# Patient Record
Sex: MALE | Race: WHITE | NOT HISPANIC OR LATINO | Employment: FULL TIME | URBAN - METROPOLITAN AREA
[De-identification: names, ages, dates, MRNs, and addresses within clinical notes are randomized per-mention and may not be internally consistent; named-entity substitution may affect disease eponyms.]

---

## 2021-04-20 ENCOUNTER — TRANSFERRED RECORDS (OUTPATIENT)
Dept: HEALTH INFORMATION MANAGEMENT | Facility: CLINIC | Age: 21
End: 2021-04-20

## 2021-05-17 ENCOUNTER — OFFICE VISIT (OUTPATIENT)
Dept: NEUROLOGY | Facility: CLINIC | Age: 21
End: 2021-05-17
Payer: COMMERCIAL

## 2021-05-17 ENCOUNTER — RECORDS - HEALTHEAST (OUTPATIENT)
Dept: ADMINISTRATIVE | Facility: OTHER | Age: 21
End: 2021-05-17

## 2021-05-17 ENCOUNTER — RECORDS - HEALTHEAST (OUTPATIENT)
Dept: SCHEDULING | Facility: CLINIC | Age: 21
End: 2021-05-17

## 2021-05-17 VITALS
WEIGHT: 152 LBS | HEIGHT: 74 IN | HEART RATE: 96 BPM | DIASTOLIC BLOOD PRESSURE: 96 MMHG | SYSTOLIC BLOOD PRESSURE: 133 MMHG | BODY MASS INDEX: 19.51 KG/M2

## 2021-05-17 DIAGNOSIS — R25.1 SHAKINESS: ICD-10-CM

## 2021-05-17 DIAGNOSIS — R68.89 HYPERLEXIA: ICD-10-CM

## 2021-05-17 DIAGNOSIS — R25.1 TREMOR: ICD-10-CM

## 2021-05-17 DIAGNOSIS — R29.2 HYPERREFLEXIA: Primary | ICD-10-CM

## 2021-05-17 PROCEDURE — 99204 OFFICE O/P NEW MOD 45 MIN: CPT | Performed by: PSYCHIATRY & NEUROLOGY

## 2021-05-17 RX ORDER — ALBUTEROL SULFATE 90 UG/1
2 AEROSOL, METERED RESPIRATORY (INHALATION) PRN
COMMUNITY

## 2021-05-17 RX ORDER — SERTRALINE HYDROCHLORIDE 100 MG/1
100 TABLET, FILM COATED ORAL DAILY
COMMUNITY

## 2021-05-17 SDOH — HEALTH STABILITY: MENTAL HEALTH: HOW MANY STANDARD DRINKS CONTAINING ALCOHOL DO YOU HAVE ON A TYPICAL DAY?: NOT ASKED

## 2021-05-17 SDOH — HEALTH STABILITY: MENTAL HEALTH: HOW OFTEN DO YOU HAVE 6 OR MORE DRINKS ON ONE OCCASION?: NOT ASKED

## 2021-05-17 SDOH — HEALTH STABILITY: MENTAL HEALTH: HOW OFTEN DO YOU HAVE A DRINK CONTAINING ALCOHOL?: NOT ASKED

## 2021-05-17 ASSESSMENT — MIFFLIN-ST. JEOR: SCORE: 1764.22

## 2021-05-17 NOTE — NURSING NOTE
Chief Complaint   Patient presents with     Extremity Weakness     Bilat leg weakness and shakines. Pt states he has had these symptoms for about a year.     Lesly Chinchilla LPN on 5/17/2021 at 9:09 AM

## 2021-05-17 NOTE — PROGRESS NOTES
INITIAL NEUROLOGY CONSULTATION    DATE OF VISIT: 5/17/2021  MRN: [id number]  PATIENT NAME: Zach Acosta Jr.  YOB: 2000    REFERRING PROVIDER: Shreyas Qureshi    Chief Complaint   Patient presents with     Extremity Weakness     Bilat leg weakness and shakines. Pt states he has had these symptoms for about a year.       SUBJECTIVE:                                                      HPI:   Zach Acosta Jr. is a 20 year old male here for consultation regarding leg weakness. No records on file with regards to this issue.     Fede is here with his mother today.  Says that he started to notice shakiness in the legs about a year ago.  He notices this particularly when walking upstairs or sometimes if he straightens the leg out start to shake.  He has not noticed any weakness per se.  No problems with his lower back that he is aware of.  No sensory changes in the legs.  He has not noticed any shakiness in the arms.  He does have history of anxiety, and does not think that increased anxiety contributes to the shakiness.  He is also on albuterol, which does not seem to make things worse.  He says that he did try doing some squats one day and thereafter he felt that the shakiness had improved.  However, he has tried this again since and exercise does not seem to make his shakiness better or worse.  He has not noticed any visual changes such as double vision.  No problems controlling the bladder or bowels.  No history of back injury or surgery.    There is family history of stroke in the patient's grandfather there Parkinson's and paternal grandfather.  The patient himself has a protein C deficiency, as does his father.  He also endorses depression.  He is on Zoloft for mood, and says he started this a couple of months ago.  The shakiness predates the start of the medication.    Patient's mom mentions that he has had tend to be very active.  Because of his anxiety he spends a lot of time in his  "room.  She wonders if his lack of exercise is contributing to his symptoms.    No past medical history on file.  No past surgical history on file.    albuterol (PROAIR HFA/PROVENTIL HFA/VENTOLIN HFA) 108 (90 Base) MCG/ACT inhaler, Inhale 2 puffs into the lungs as needed for shortness of breath / dyspnea or wheezing  sertraline (ZOLOFT) 100 MG tablet, Take 100 mg by mouth daily    No current facility-administered medications on file prior to visit.     No Known Allergies     Problem (# of Occurrences) Relation (Name,Age of Onset)    Parkinsonism (1) Paternal Grandfather    Protein C deficiency (1) Father        Social History     Tobacco Use     Smoking status: Current Every Day Smoker     Smokeless tobacco: Never Used     Tobacco comment: e-cig   Substance Use Topics     Alcohol use: Yes     Comment: occas     Drug use: None       REVIEW OF SYSTEMS:                                                      10-point review of systems is negative except as mentioned above in HPI.     EXAM:                                                      Physical Exam:   Vitals: BP (!) 133/96 (BP Location: Right arm, Patient Position: Sitting)   Pulse 96   Ht 1.88 m (6' 2\")   Wt 68.9 kg (152 lb)   BMI 19.52 kg/m    BMI= Body mass index is 19.52 kg/m .  GENERAL: NAD. Normal affect.  HEENT: NC/AT.   CV: RRR. S1, S2.   NECK: No bruits.  PULM: Non-labored breathing.   EXTR: Fingertips are pink (he says they are always this way). Warm, well-perfused.  Neurologic:  MENTAL STATUS: Alert, attentive. Speech is fluent. Normal comprehension. Normal concentration. Adequate fund of knowledge.   CRANIAL NERVES: Discs flat. Visual fields intact to confrontation. Pupils equally, round and reactive to light. Facial sensation and movement normal. EOM full. Hearing intact to conversation. Trapezius strength intact. Palate moves symmetrically. Tongue midline.  MOTOR: Slight weakness with hip flexion on the right. Otherwise strength is 5/5 in proximal " and distal muscle groups of upper and lowerextremities.   Tone and bulk normal.   DTRs: Intact and symmetric in biceps, BR, triceps (2+). 3+ at patellae bilaterally.  Babinski down-going bilaterally. Question of clonus, but I cannot tell if this is just his underlying shakiness.  SENSATION: Normal light touch and pinprick. Intact proprioception at great toes. Vibration: Slightly decreased at both ankles.   COORDINATION: Normal finger nose finger. Finger tapping normal. Knee heel shin normal.  STATION AND GAIT: Romberg Negative. Good postural reflexes. Casual gait and tandem normal.  Intermittent leg tremor (L>R), noted when patient was seated.  Right hand-dominant.    ASSESSMENT and PLAN:                                                      Assessment:     ICD-10-CM    1. Hyperreflexia  R29.2 Comprehensive metabolic panel     CK total     TSH with free T4 reflex     Vitamin B12     Vitamin B6     Vitamin D Deficiency     MR Lumbar Spine w/o Contrast   2. Tremor  R25.1    3. Shakiness  R25.1 Comprehensive metabolic panel     CK total     TSH with free T4 reflex     Vitamin B12     Vitamin B6     Vitamin D Deficiency     MR Lumbar Spine w/o Contrast        Mr. Acosta is a pleasant 20-year-old man with history of anxiety and depression, asthma, here for one year history of shakiness in the legs.  The shakiness is intermittent.  He is quite brisk in the lower extremities and I detected perhaps slight weakness in the proximal muscles of the right lower extremity.  I had difficulty in assessing Zach for clonus, with the underlying shakiness confounding the exam.  I am wondering about a possible underlying myopathy.  I do not suspect a neuromuscular junction disorder.  We will go ahead and do some basic labs and then get a lumbar MRI to see if this provides any clues regarding the etiology of his shakiness.  We may want to go ahead and do an EMG if the lumbar imaging is not diagnostic.  I explained to the patient and  his mother that this could also just be an underlying tremor.  If we do not find an alternative cause for the shakiness, we could consider doing a trial of a medication to calm the tremor.  I would like to see Zach back in a couple of months.  He understands and agrees with the plan.    Zach to follow up with Primary Care provider regarding elevated blood pressure.    Plan:  -- Labs for the shakiness.   -- Lumbar MRI.  -- We we will notify you of the test results and let you know if any additional evaluations are recommended.  -- Return to Neurology in 2 months.     Total Time: 45 minutes were spent with the patient and in chart review/documentation (as described above in Assessment and Plan) /coordinating the care.    Letty Alfaro MD  Neurology    CC: Shreyas Qureshi MD    Dragon software used in dictation of this note.

## 2021-05-17 NOTE — LETTER
5/17/2021         RE: Zach Acosta Jr.  [address]  [place] [address]        Dear Colleague,    Thank you for referring your patient, Zach Acosta Jr., to the [facility] [place]. Please see a copy of my visit note below.    INITIAL NEUROLOGY CONSULTATION    DATE OF VISIT: 5/17/2021  MRN: [id number]  PATIENT NAME: Zach Acosta Jr.  YOB: 2000    REFERRING PROVIDER: Shreyas Qureshi    Chief Complaint   Patient presents with     Extremity Weakness     Bilat leg weakness and shakines. Pt states he has had these symptoms for about a year.       SUBJECTIVE:                                                      HPI:   Zach Acosta Jr. is a 20 year old male here for consultation regarding leg weakness. No records on file with regards to this issue.     Fede is here with his mother today.  Says that he started to notice shakiness in the legs about a year ago.  He notices this particularly when walking upstairs or sometimes if he straightens the leg out start to shake.  He has not noticed any weakness per se.  No problems with his lower back that he is aware of.  No sensory changes in the legs.  He has not noticed any shakiness in the arms.  He does have history of anxiety, and does not think that increased anxiety contributes to the shakiness.  He is also on albuterol, which does not seem to make things worse.  He says that he did try doing some squats one day and thereafter he felt that the shakiness had improved.  However, he has tried this again since and exercise does not seem to make his shakiness better or worse.  He has not noticed any visual changes such as double vision.  No problems controlling the bladder or bowels.  No history of back injury or surgery.    There is family history of stroke in the patient's grandfather there Parkinson's and paternal grandfather.  The patient himself has a protein C deficiency, as does his father.   "He also endorses depression.  He is on Zoloft for mood, and says he started this a couple of months ago.  The shakiness predates the start of the medication.    Patient's mom mentions that he has had tend to be very active.  Because of his anxiety he spends a lot of time in his room.  She wonders if his lack of exercise is contributing to his symptoms.    No past medical history on file.  No past surgical history on file.    albuterol (PROAIR HFA/PROVENTIL HFA/VENTOLIN HFA) 108 (90 Base) MCG/ACT inhaler, Inhale 2 puffs into the lungs as needed for shortness of breath / dyspnea or wheezing  sertraline (ZOLOFT) 100 MG tablet, Take 100 mg by mouth daily    No current facility-administered medications on file prior to visit.     No Known Allergies     Problem (# of Occurrences) Relation (Name,Age of Onset)    Parkinsonism (1) Paternal Grandfather    Protein C deficiency (1) Father        Social History     Tobacco Use     Smoking status: Current Every Day Smoker     Smokeless tobacco: Never Used     Tobacco comment: e-cig   Substance Use Topics     Alcohol use: Yes     Comment: occas     Drug use: None       REVIEW OF SYSTEMS:                                                      10-point review of systems is negative except as mentioned above in HPI.     EXAM:                                                      Physical Exam:   Vitals: BP (!) 133/96 (BP Location: Right arm, Patient Position: Sitting)   Pulse 96   Ht 1.88 m (6' 2\")   Wt 68.9 kg (152 lb)   BMI 19.52 kg/m    BMI= Body mass index is 19.52 kg/m .  GENERAL: NAD. Normal affect.  HEENT: NC/AT.   CV: RRR. S1, S2.   NECK: No bruits.  PULM: Non-labored breathing.   EXTR: Fingertips are pink (he says they are always this way). Warm, well-perfused.  Neurologic:  MENTAL STATUS: Alert, attentive. Speech is fluent. Normal comprehension. Normal concentration. Adequate fund of knowledge.   CRANIAL NERVES: Discs flat. Visual fields intact to confrontation. Pupils " equally, round and reactive to light. Facial sensation and movement normal. EOM full. Hearing intact to conversation. Trapezius strength intact. Palate moves symmetrically. Tongue midline.  MOTOR: Slight weakness with hip flexion on the right. Otherwise strength is 5/5 in proximal and distal muscle groups of upper and lowerextremities.   Tone and bulk normal.   DTRs: Intact and symmetric in biceps, BR, triceps (2+). 3+ at patellae bilaterally.  Babinski down-going bilaterally. Question of clonus, but I cannot tell if this is just his underlying shakiness.  SENSATION: Normal light touch and pinprick. Intact proprioception at great toes. Vibration: Slightly decreased at both ankles.   COORDINATION: Normal finger nose finger. Finger tapping normal. Knee heel shin normal.  STATION AND GAIT: Romberg Negative. Good postural reflexes. Casual gait and tandem normal.  Intermittent leg tremor (L>R), noted when patient was seated.  Right hand-dominant.    ASSESSMENT and PLAN:                                                      Assessment:     ICD-10-CM    1. Hyperreflexia  R29.2 Comprehensive metabolic panel     CK total     TSH with free T4 reflex     Vitamin B12     Vitamin B6     Vitamin D Deficiency     MR Lumbar Spine w/o Contrast   2. Tremor  R25.1    3. Shakiness  R25.1 Comprehensive metabolic panel     CK total     TSH with free T4 reflex     Vitamin B12     Vitamin B6     Vitamin D Deficiency     MR Lumbar Spine w/o Contrast        Mr. Acosta is a pleasant 20-year-old man with history of anxiety and depression, asthma, here for one year history of shakiness in the legs.  The shakiness is intermittent.  He is quite brisk in the lower extremities and I detected perhaps slight weakness in the proximal muscles of the right lower extremity.  I had difficulty in assessing Zach for clonus, with the underlying shakiness confounding the exam.  I am wondering about a possible underlying myopathy.  I do not suspect a  neuromuscular junction disorder.  We will go ahead and do some basic labs and then get a lumbar MRI to see if this provides any clues regarding the etiology of his shakiness.  We may want to go ahead and do an EMG if the lumbar imaging is not diagnostic.  I explained to the patient and his mother that this could also just be an underlying tremor.  If we do not find an alternative cause for the shakiness, we could consider doing a trial of a medication to calm the tremor.  I would like to see Zach back in a couple of months.  He understands and agrees with the plan.    Zach to follow up with Primary Care provider regarding elevated blood pressure.    Plan:  -- Labs for the shakiness.   -- Lumbar MRI.  -- We we will notify you of the test results and let you know if any additional evaluations are recommended.  -- Return to Neurology in 2 months.     Total Time: 45 minutes were spent with the patient and in chart review/documentation (as described above in Assessment and Plan) /coordinating the care.    Letty Alfaro MD  Neurology    CC: Shreyas Qureshi MD    Dragon software used in dictation of this note.        Again, thank you for allowing me to participate in the care of your patient.        Sincerely,        Letty Alfaro MD

## 2021-05-17 NOTE — PATIENT INSTRUCTIONS
Plan:  -- Labs for the shakiness.   -- Lumbar MRI.  -- We we will notify you of the test results and let you know if any additional evaluations are recommended.  -- Return to Neurology in 2 months.

## 2021-07-28 ENCOUNTER — APPOINTMENT (OUTPATIENT)
Dept: URBAN - METROPOLITAN AREA CLINIC 260 | Age: 21
Setting detail: DERMATOLOGY
End: 2021-07-29

## 2021-07-28 VITALS — WEIGHT: 155 LBS | RESPIRATION RATE: 15 BRPM | HEIGHT: 74 IN

## 2021-07-28 DIAGNOSIS — L72.0 EPIDERMAL CYST: ICD-10-CM

## 2021-07-28 DIAGNOSIS — D22 MELANOCYTIC NEVI: ICD-10-CM

## 2021-07-28 PROBLEM — D22.39 MELANOCYTIC NEVI OF OTHER PARTS OF FACE: Status: ACTIVE | Noted: 2021-07-28

## 2021-07-28 PROBLEM — D22.5 MELANOCYTIC NEVI OF TRUNK: Status: ACTIVE | Noted: 2021-07-28

## 2021-07-28 PROCEDURE — OTHER INCISION AND DRAINAGE: OTHER

## 2021-07-28 PROCEDURE — 99202 OFFICE O/P NEW SF 15 MIN: CPT | Mod: 25

## 2021-07-28 PROCEDURE — OTHER COUNSELING: OTHER

## 2021-07-28 PROCEDURE — 10060 I&D ABSCESS SIMPLE/SINGLE: CPT

## 2021-07-28 PROCEDURE — OTHER ADDITIONAL NOTES: OTHER

## 2021-07-28 ASSESSMENT — LOCATION DETAILED DESCRIPTION DERM
LOCATION DETAILED: LEFT CENTRAL ZYGOMA
LOCATION DETAILED: RIGHT SUPERIOR CENTRAL MALAR CHEEK
LOCATION DETAILED: RIGHT LATERAL ABDOMEN

## 2021-07-28 ASSESSMENT — LOCATION SIMPLE DESCRIPTION DERM
LOCATION SIMPLE: RIGHT CHEEK
LOCATION SIMPLE: LEFT ZYGOMA
LOCATION SIMPLE: ABDOMEN

## 2021-07-28 ASSESSMENT — LOCATION ZONE DERM
LOCATION ZONE: TRUNK
LOCATION ZONE: FACE

## 2021-07-28 NOTE — PROCEDURE: ADDITIONAL NOTES
Additional Notes: Patient deferred cosmetic treatment today. If lesions become bothersome in the future, removal would be appropriate. Patient verbalized understanding.
Detail Level: Simple
Render Risk Assessment In Note?: no

## 2021-07-28 NOTE — PROCEDURE: INCISION AND DRAINAGE
Method: 11 blade
Curette Text (Optional): After the contents were expressed a curette was used to partially remove the cyst wall.
Drainage Type?: cyst-like
Include Sutures?: No
Consent was obtained and risks were reviewed including but not limited to delayed wound healing, infection, need for multiple I and D's, and pain.
Detail Level: Detailed
Epidermal Closure: simple interrupted
Epidermal Sutures: 4-0 Ethilon
Suture Text: The incision was partially closed with
Size Of Lesion In Cm (Optional But May Be Required For Some Insurances): 0
Post-Care Instructions: I reviewed with the patient in detail post-care instructions. Patient should keep wound covered and call the office should any redness, pain, swelling or worsening occur.
Drainage Amount?: minimal
Preparation Text: The area was prepped in the usual clean fashion.
Lesion Type: Abscess
Dressing: no dressing

## 2022-06-13 ENCOUNTER — TELEPHONE (OUTPATIENT)
Dept: HEMATOLOGY | Facility: CLINIC | Age: 22
End: 2022-06-13
Payer: COMMERCIAL

## 2022-06-13 DIAGNOSIS — Z83.2 FAMILY HISTORY OF CLOTTING DISORDER: Primary | ICD-10-CM

## 2022-06-13 NOTE — TELEPHONE ENCOUNTER
Zach Acosta Jr is a 21yr old male who is son of Zach Acosta Sr.  Zach Khan is followed by the [facility] for his dx of Factor 5 Leiden mutation and protein C deficiency.    Call received from Zach Kowalski and his mother Shanice, to inquire about getting tested for Factor 5 Leiden and protein C deficiency.      Order placed and lab draw scheduled for 6/17 at 4pm at the [facility].    Wendy OHN, RN, PHN   [facility]   Office: [phone]  Fax: [phone]

## 2022-06-24 ENCOUNTER — LAB (OUTPATIENT)
Dept: LAB | Facility: CLINIC | Age: 22
End: 2022-06-24
Payer: COMMERCIAL

## 2022-06-24 DIAGNOSIS — Z83.2 FAMILY HISTORY OF CLOTTING DISORDER: ICD-10-CM

## 2022-06-24 LAB
FACTOR V INTERPRETATION: NORMAL
LAB DIRECTOR COMMENTS: NORMAL
LAB DIRECTOR DISCLAIMER: NORMAL
LAB DIRECTOR INTERPRETATION: NORMAL
LAB DIRECTOR METHODOLOGY: NORMAL
LAB DIRECTOR RESULTS: NORMAL
SPECIMEN DESCRIPTION: NORMAL

## 2022-06-24 PROCEDURE — 85303 CLOT INHIBIT PROT C ACTIVITY: CPT

## 2022-06-24 PROCEDURE — 81241 F5 GENE: CPT

## 2022-06-24 PROCEDURE — 36415 COLL VENOUS BLD VENIPUNCTURE: CPT

## 2022-06-24 PROCEDURE — G0452 MOLECULAR PATHOLOGY INTERPR: HCPCS | Mod: 26 | Performed by: PATHOLOGY

## 2022-06-27 LAB — PROT C ACT/NOR PPP CHRO: 52 % (ref 70–170)

## 2022-07-28 ENCOUNTER — OFFICE VISIT (OUTPATIENT)
Dept: HEMATOLOGY | Facility: CLINIC | Age: 22
End: 2022-07-28
Attending: INTERNAL MEDICINE
Payer: COMMERCIAL

## 2022-07-28 VITALS
TEMPERATURE: 97.5 F | RESPIRATION RATE: 16 BRPM | BODY MASS INDEX: 20.58 KG/M2 | OXYGEN SATURATION: 99 % | HEIGHT: 73 IN | HEART RATE: 65 BPM | WEIGHT: 155.3 LBS | DIASTOLIC BLOOD PRESSURE: 81 MMHG | SYSTOLIC BLOOD PRESSURE: 122 MMHG

## 2022-07-28 DIAGNOSIS — Z82.49 FAMILY HISTORY OF DEEP VEIN THROMBOSIS: ICD-10-CM

## 2022-07-28 DIAGNOSIS — Z82.49 FAMILY HISTORY OF PULMONARY EMBOLISM: ICD-10-CM

## 2022-07-28 DIAGNOSIS — D68.59 PROTEIN C DEFICIENCY (H): Primary | ICD-10-CM

## 2022-07-28 PROCEDURE — 99205 OFFICE O/P NEW HI 60 MIN: CPT | Performed by: INTERNAL MEDICINE

## 2022-07-28 PROCEDURE — G0463 HOSPITAL OUTPT CLINIC VISIT: HCPCS

## 2022-07-28 ASSESSMENT — PAIN SCALES - GENERAL: PAINLEVEL: NO PAIN (0)

## 2022-07-28 NOTE — PROGRESS NOTES
"    [facility]  [facility]  [facility] [address], [address], [place] [address]  Main: [phone], Fax: [phone]         Outpatient Clinic Visit  Date:  07/28/2022    Zach \"Cheo\"Jr. Karla is a 21-year-old male with a family history of genetic thrombophilia who is here today for consultation regarding his risk of thrombosis.  He is accompanied by his 2 siblings and his parents.    Cheo is a member of a very large extended family from which we have seen several individuals over the last 3 years.  Both factor V Leiden and protein C deficiency are present in this family.  We have identified 9 individuals with documented venous thromboembolism.  8 of those 9 individuals have had their thrombotic event before age 40, and 4 occurred before age 30.  There are at least 4 unprovoked episodes of venous thromboembolism in the family.  Thus far, all thrombotic events in the family have occurred in individuals known to have both factor V Leiden and protein C deficiency although our data are incomplete in that regard.  We have recommended initiation of primary anticoagulant prophylaxis for some members of this family.    Cheo has no personal history of venous thrombosis.  His medical history is remarkable for asthma for which he uses an inhaler as needed.    Physical exam:   He appears healthy.  Detailed exam not performed.    Labs:  Factor V Leiden negative  Protein C activity 52% (normal )      ASSESSMENT / PLAN:  1.  Strong family history of venous thromboembolism and genetic thrombophilia  2.  No personal history of thrombosis  3.  Protein C deficiency    We had a detailed discussion today with Cheo and his family regarding venous thromboembolism and genetic thrombophilia.  We discussed factor V Leiden and protein C deficiency in detail including the mode of inheritance, and the differences in risk associated with each of these genetic thrombophilias.  We " discussed the concept of genetic predisposition plus acquired triggers leading to episodes of venous thromboembolism, and how the interaction of 2 or more genetic and/or acquired risk factors magnifies risk.  We also discussed the effect of age on the risk of venous thromboembolism.    Cheo does not have factor V Leiden but he is protein C deficient.  We discussed that in the context of his extended family's history, gauging his thrombotic risk is difficult as thus far all individuals with documented episodes of venous thromboembolism are those who have both factor V Leiden and protein C deficiency.  At the present time, we have not seen any individuals from this family who only have protein C deficiency.  Thus, although his risk of venous thromboembolism is certainly increased, it is likely lower than other family members who have had episodes of venous thromboembolism.  That said, it is difficult to know how much lower his risk may be.  We have recommended initiation of primary anticoagulant prophylaxis for some members of his extended family who are double heterozygous for factor V Leiden and protein C deficiency, but it is unclear whether that same recommendation is appropriate for Cheo.    Given that he is still young, this decision is one which can continue to be considered.  That said, other members of his extended family have had episodes of venous thromboembolism prior to age 30.  Thus, I would recommend at least annual reassessment and reconsideration of his options.  In addition, if additional information on other members of his family becomes available (particularly those who are only protein C deficient), that may also have an impact on our thinking.    In the meantime, we discussed the signs and symptoms of deep vein thrombosis and pulmonary embolism and the importance of prompt evaluation should there be any suspicion.  We also discussed the importance of aggressive prophylaxis during times of  increased risk including with injury and immobilization, and surgical procedures (even those typically considered low risk).    During the course of our discussion, Cheo and his family asked insightful questions and appeared to have a clear understanding of all the things we reviewed.  We will schedule him for a 1 year return visit to reassess the option of initiation of primary anticoagulant prophylaxis.  In the meantime, he was given our contact information and encouraged to call with any new questions or concerns.      Total time 60 minutes, including review of medical records and labs, clinic visit, and documentation.      Aubrey Correa MD  Professor of Medicine   Division of Hematology, Oncology, and Transplantation  Director, [facility]

## 2022-08-23 ENCOUNTER — TELEPHONE (OUTPATIENT)
Dept: HEMATOLOGY | Facility: CLINIC | Age: 22
End: 2022-08-23

## 2022-08-23 NOTE — TELEPHONE ENCOUNTER
Zach Acosta is a 21 year old male seen by Dr. Correa at the [facility] for his protein c deficiency. He is calling today because he is contemplating starting Xarelto. He would like to speak with Dr. Correa 1 more time. RN spoke with Dr. Correa who will reach out via telephone early next week. Zach was fine with this plan.     Jessi REYNAGA, RN, PHN  [facility]  Office: [phone]  Fax: [phone]    Addendum:    Call received from Shanice Acosta, Zach Kowalski's mother, who states that Zach Kowalski has decided he's ready to start his Xarelto. Dr. Correa sent Xarelto 20mg tablets to the [facility] in [place]. Staff shared instructions and went to call the [facility] with Shanice, who will share with Zach Kowalski.    Wendy OHN, RN, PHN   [facility]   Office: [phone]  Fax: [phone]

## 2022-09-08 DIAGNOSIS — D68.59 PROTEIN C DEFICIENCY (H): Primary | ICD-10-CM

## 2022-09-08 DIAGNOSIS — Z82.49 FAMILY HISTORY OF DEEP VEIN THROMBOSIS: ICD-10-CM

## 2022-09-08 DIAGNOSIS — Z82.49 FAMILY HISTORY OF PULMONARY EMBOLISM: ICD-10-CM

## 2023-07-05 ENCOUNTER — TELEPHONE (OUTPATIENT)
Dept: HEMATOLOGY | Facility: CLINIC | Age: 23
End: 2023-07-05
Payer: COMMERCIAL

## 2023-08-04 ENCOUNTER — TELEPHONE (OUTPATIENT)
Dept: HEMATOLOGY | Facility: CLINIC | Age: 23
End: 2023-08-04
Payer: COMMERCIAL

## 2024-09-26 ENCOUNTER — APPOINTMENT (OUTPATIENT)
Dept: URBAN - METROPOLITAN AREA CLINIC 260 | Age: 24
Setting detail: DERMATOLOGY
End: 2024-09-26

## 2024-09-26 VITALS — HEIGHT: 60 IN | WEIGHT: 160 LBS

## 2024-09-26 DIAGNOSIS — D485 NEOPLASM OF UNCERTAIN BEHAVIOR OF SKIN: ICD-10-CM

## 2024-09-26 DIAGNOSIS — L71.8 OTHER ROSACEA: ICD-10-CM

## 2024-09-26 PROBLEM — D48.5 NEOPLASM OF UNCERTAIN BEHAVIOR OF SKIN: Status: ACTIVE | Noted: 2024-09-26

## 2024-09-26 PROCEDURE — OTHER PRESCRIPTION: OTHER

## 2024-09-26 PROCEDURE — OTHER MIPS QUALITY: OTHER

## 2024-09-26 PROCEDURE — OTHER PRESCRIPTION MEDICATION MANAGEMENT: OTHER

## 2024-09-26 PROCEDURE — OTHER BIOPSY BY SHAVE METHOD: OTHER

## 2024-09-26 PROCEDURE — 11102 TANGNTL BX SKIN SINGLE LES: CPT

## 2024-09-26 PROCEDURE — 99214 OFFICE O/P EST MOD 30 MIN: CPT | Mod: 25

## 2024-09-26 PROCEDURE — OTHER COUNSELING: OTHER

## 2024-09-26 RX ORDER — SODIUM SULFACETAMIDE, SULFUR 50; 100 MG/G; MG/G
LOTION TOPICAL
Qty: 170 | Refills: 4 | Status: ERX | COMMUNITY
Start: 2024-09-26

## 2024-09-26 RX ORDER — AZELAIC ACID 0.15 G/G
GEL TOPICAL BID
Qty: 50 | Refills: 3 | Status: ERX | COMMUNITY
Start: 2024-09-26

## 2024-09-26 ASSESSMENT — LOCATION ZONE DERM
LOCATION ZONE: FACE
LOCATION ZONE: FACE

## 2024-09-26 ASSESSMENT — LOCATION DETAILED DESCRIPTION DERM
LOCATION DETAILED: RIGHT INFERIOR CENTRAL MALAR CHEEK
LOCATION DETAILED: LEFT CENTRAL MALAR CHEEK
LOCATION DETAILED: RIGHT INFERIOR CENTRAL MALAR CHEEK
LOCATION DETAILED: LEFT INFERIOR CENTRAL MALAR CHEEK
LOCATION DETAILED: RIGHT MEDIAL MALAR CHEEK

## 2024-09-26 ASSESSMENT — LOCATION SIMPLE DESCRIPTION DERM
LOCATION SIMPLE: LEFT CHEEK
LOCATION SIMPLE: LEFT CHEEK
LOCATION SIMPLE: RIGHT CHEEK
LOCATION SIMPLE: RIGHT CHEEK

## 2024-09-26 NOTE — PROCEDURE: PRESCRIPTION MEDICATION MANAGEMENT
Initiate Treatment: sulfacetamide sodium-sulfur 10 %-5 % (w/w) topical cleanser Wash face once a day. Leave on for 5-10 mins then rinse\\n\\nazelaic acid 15 % topical gel Apply thin layer to the face twice a day for rosacea
Detail Level: Zone
Render In Strict Bullet Format?: No

## 2024-09-26 NOTE — PROCEDURE: BIOPSY BY SHAVE METHOD
Detail Level: Detailed
Depth Of Biopsy: dermis
Was A Bandage Applied: Yes
Size Of Lesion In Cm: 0
Biopsy Type: H and E
Biopsy Method: Dermablade
Anesthesia Type: 1% lidocaine with epinephrine
Anesthesia Volume In Cc: 0.5
Hemostasis: Drysol
Wound Care: Petrolatum
Dressing: bandage
Destruction After The Procedure: No
Type Of Destruction Used: Curettage
Curettage Text: The wound bed was treated with curettage after the biopsy was performed.
Cryotherapy Text: The wound bed was treated with cryotherapy after the biopsy was performed.
Electrodesiccation Text: The wound bed was treated with electrodesiccation after the biopsy was performed.
Electrodesiccation And Curettage Text: The wound bed was treated with electrodesiccation and curettage after the biopsy was performed.
Silver Nitrate Text: The wound bed was treated with silver nitrate after the biopsy was performed.
Lab: [id number]
Consent: Written consent was obtained and risks were reviewed including but not limited to scarring, infection, bleeding, scabbing, incomplete removal, nerve damage and allergy to anesthesia.
Post-Care Instructions: I reviewed with the patient in detail post-care instructions. Patient is to keep the biopsy site dry overnight, and then apply bacitracin twice daily until healed. Patient may apply hydrogen peroxide soaks to remove any crusting.
Notification Instructions: Patient will be notified of biopsy results. However, patient instructed to call the office if not contacted within 2 weeks.
Billing Type: Third-Party Bill
Information: Selecting Yes will display possible errors in your note based on the variables you have selected. This validation is only offered as a suggestion for you. PLEASE NOTE THAT THE VALIDATION TEXT WILL BE REMOVED WHEN YOU FINALIZE YOUR NOTE. IF YOU WANT TO FAX A PRELIMINARY NOTE YOU WILL NEED TO TOGGLE THIS TO 'NO' IF YOU DO NOT WANT IT IN YOUR FAXED NOTE.